# Patient Record
Sex: FEMALE | Race: ASIAN
[De-identification: names, ages, dates, MRNs, and addresses within clinical notes are randomized per-mention and may not be internally consistent; named-entity substitution may affect disease eponyms.]

---

## 2017-09-19 ENCOUNTER — HOSPITAL ENCOUNTER (OUTPATIENT)
Dept: HOSPITAL 36 - RAD | Age: 57
Discharge: HOME | End: 2017-09-19
Payer: MEDICARE

## 2017-09-19 DIAGNOSIS — R19.00: Primary | ICD-10-CM

## 2017-09-19 NOTE — DIAGNOSTIC IMAGING REPORT
Pelvic ultrasound



HISTORY: Mass.



The exam is limited to transabdominal sonographic technique. The patient

declined endovaginal sonographic evaluation. The study is quite limited

due to lack of patient cooperation.



The uterus is not visualized. Finding should be correlated with patient

history. The ovaries cannot be seen. No abnormal masses or abnormal

fluid collections.



IMPRESSION:

1. Very limited exam due to the factors noted above

2. The uterus and ovaries are not identified. The findings should be

correlated with patient's surgical history. No abnormal masses or fluid

collections identified.

## 2019-02-27 ENCOUNTER — HOSPITAL ENCOUNTER (EMERGENCY)
Dept: HOSPITAL 36 - ER | Age: 59
Discharge: HOME | End: 2019-02-27
Payer: MEDICARE
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
## 2019-02-27 DIAGNOSIS — S00.03XA: Primary | ICD-10-CM
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
## 2019-02-27 PROCEDURE — 96372 THER/PROPH/DIAG INJ SC/IM: CPT

## 2019-02-27 PROCEDURE — 99283 EMERGENCY DEPT VISIT LOW MDM: CPT

## 2019-02-27 PROCEDURE — Z7502: HCPCS

## 2019-02-28 NOTE — ER PHYSICIAN DOCUMENTATION
DATE OF SERVICE:  02/27/2019



HISTORY OF PRESENT ILLNESS:  This patient presents to the Emergency Room with an

accidental fall resulting in a head injury.  This patient apparently lives in a

[facility] and care facility.  She presented to the Emergency Room after an

accidental fall, hitting her head.  Some bruises were noted on the head.  The

patient was brought in for further evaluation by the staff.  The patient was

alert and active throughout the whole Emergency Room visit and there was no

evidence of loss of consciousness, altered level of conscious, altered mental

status.  No nausea, vomiting, decreased activity, visual or gait changes.  No

weakness, dizziness, vertigo, paresthesias or gait changes.  No report of

headaches, chest pain, neck pain, shortness of breath, abdominal pain, nausea,

vomiting or diarrhea or constipation.  The patient is eating and urinating well.

 The patient last urinated about an hour prior to admission.



PAST MEDICAL HISTORY:  As per nurse's notes.



MEDICATIONS:  Per nurse's notes.



FAMILY HISTORY:  Per nurses' notes.



ALLERGIES:  Per nurse's notes.



REVIEW OF SYSTEMS:  Otherwise, noncontributory.  The patient's last tetanus shot

is up to date, is less than 5 years.  



PHYSICAL EXAMINATION:

GENERAL:  Found the patient to be in no acute distress, alert and active with

her usual mental status.  She is alert and oriented x 3.

VITAL SIGNS:  Afebrile.  Vital signs are stable.

HEENT:  Head examination revealed frontal scalp contusions.  There are no

abrasions.  Pupils equal, round, reactive to light.  Fundi benign.  Extraocular

muscles seemed to be within normal limits.  No otorrhea, rhinorrhea, or loose

teeth.  Negative Rubio sign.  There was no epistaxis.  No bleeding in any of

the orifices.  Pharynx is within normal limits.

NECK:  Supple.  No cervical tenderness.  No meningeal signs.  No bruits.

CARDIOVASCULAR:  Regular rate and rhythm.

LUNGS:  Clear with good breath sounds bilaterally.  No evidence of chest trauma.

ABDOMEN:  No evidence of abdominal trauma.  Soft, nontender, normal active bowel

sounds, no pulsatile masses.

EXTREMITIES:  No edema, clubbing or cyanosis.

NEUROLOGIC:  Showed no focal signs.



MEDICAL DECISION MAKING:  The patient refused x-rays which would have included

CAT scan of the head, C-spine and face.  The patient refused x-rays and thus was

discharged.  The patient was asymptomatic upon discharge and the patient was

discharged with aftercare instructions given for all the above diagnoses.  The

patient is to return to the Emergency Room as needed if existing signs and

symptoms should recur and/or get worse and/or any other new signs or symptoms

should occur.  Refer to a neurosurgeon as soon as possible, refer to internist

as soon as possible.  Followup care with primary physician in one day or as

needed.  The patient is to return to Emergency Room as needed if concerned. 

Aftercare instructions given.



DIAGNOSES:  Scalp contusions, head injury.





DD: 02/28/2019 11:11

DT: 02/28/2019 11:24

[facility]# [id number]  [id number]